# Patient Record
Sex: FEMALE | Race: OTHER
[De-identification: names, ages, dates, MRNs, and addresses within clinical notes are randomized per-mention and may not be internally consistent; named-entity substitution may affect disease eponyms.]

---

## 2023-04-18 ENCOUNTER — NURSE TRIAGE (OUTPATIENT)
Dept: OTHER | Facility: CLINIC | Age: 28
End: 2023-04-18

## 2023-04-18 NOTE — TELEPHONE ENCOUNTER
Location of patient: [place]    Received call from Candy Mcneil at [facility]; Patient with Red Flag Complaint requesting to establish care with [facility]. Subjective: Caller states \"Dizzy\"     Current Symptoms:   Dizziness with standing or \"moving too fast\"  \"I feel off-balanced\"  Nausea  Intermittent HA's  Intermittent bilateral hand swelling  Denies cold sx, CP, SOB, or weakness/numbness on one side of the body    Onset: 1 week ago; intermittent    Pain Severity: 0/10    Temperature: Denies    What has been tried: Drinking fluids    LMP:  Depo  Pregnant: No    Recommended disposition: See PCP within 3 Days  Advised UCC if no available appointments. Care advice provided, patient verbalizes understanding; denies any other questions or concerns; instructed to call back for any new or worsening symptoms. Patient/Caller agrees with recommended disposition; writer provided warm transfer to Bernice Brooks at [facility] for appointment scheduling    Attention Provider: Thank you for allowing me to participate in the care of your patient. The patient was connected to triage in response to information provided to the [facility]. Please do not respond through this encounter as the response is not directed to a shared pool.     Reason for Disposition   MILD dizziness (e.g., walking normally) and has NOT been evaluated by physician for this (Exception: dizziness caused by heat exposure, sudden standing, or poor fluid intake)    Protocols used: Dizziness-ADULT-[place]

## 2023-05-11 ENCOUNTER — OFFICE VISIT (OUTPATIENT)
Dept: FAMILY MEDICINE CLINIC | Facility: CLINIC | Age: 28
End: 2023-05-11
Payer: COMMERCIAL

## 2023-05-11 VITALS
RESPIRATION RATE: 17 BRPM | SYSTOLIC BLOOD PRESSURE: 138 MMHG | BODY MASS INDEX: 44.41 KG/M2 | TEMPERATURE: 98 F | OXYGEN SATURATION: 97 % | DIASTOLIC BLOOD PRESSURE: 84 MMHG | HEART RATE: 74 BPM | HEIGHT: 68 IN | WEIGHT: 293 LBS

## 2023-05-11 DIAGNOSIS — Z13.220 SCREENING FOR LIPID DISORDERS: ICD-10-CM

## 2023-05-11 DIAGNOSIS — F41.9 ANXIETY AND DEPRESSION: ICD-10-CM

## 2023-05-11 DIAGNOSIS — F32.A ANXIETY AND DEPRESSION: ICD-10-CM

## 2023-05-11 DIAGNOSIS — R42 DIZZINESS: ICD-10-CM

## 2023-05-11 DIAGNOSIS — R53.83 FATIGUE, UNSPECIFIED TYPE: ICD-10-CM

## 2023-05-11 DIAGNOSIS — R63.5 WEIGHT GAIN: Primary | ICD-10-CM

## 2023-05-11 DIAGNOSIS — K21.9 GASTROESOPHAGEAL REFLUX DISEASE WITHOUT ESOPHAGITIS: ICD-10-CM

## 2023-05-11 PROCEDURE — 99205 OFFICE O/P NEW HI 60 MIN: CPT

## 2023-05-11 RX ORDER — ESCITALOPRAM OXALATE 5 MG/1
5 TABLET ORAL DAILY
Qty: 30 TABLET | Refills: 1 | Status: SHIPPED | OUTPATIENT
Start: 2023-05-11

## 2023-05-11 RX ORDER — OMEPRAZOLE 40 MG/1
40 CAPSULE, DELAYED RELEASE ORAL
Qty: 90 CAPSULE | Refills: 1 | Status: SHIPPED | OUTPATIENT
Start: 2023-05-11

## 2023-05-11 SDOH — ECONOMIC STABILITY: HOUSING INSECURITY
IN THE LAST 12 MONTHS, WAS THERE A TIME WHEN YOU DID NOT HAVE A STEADY PLACE TO SLEEP OR SLEPT IN A SHELTER (INCLUDING NOW)?: NO

## 2023-05-11 SDOH — ECONOMIC STABILITY: FOOD INSECURITY: WITHIN THE PAST 12 MONTHS, YOU WORRIED THAT YOUR FOOD WOULD RUN OUT BEFORE YOU GOT MONEY TO BUY MORE.: SOMETIMES TRUE

## 2023-05-11 SDOH — ECONOMIC STABILITY: INCOME INSECURITY: HOW HARD IS IT FOR YOU TO PAY FOR THE VERY BASICS LIKE FOOD, HOUSING, MEDICAL CARE, AND HEATING?: SOMEWHAT HARD

## 2023-05-11 SDOH — ECONOMIC STABILITY: FOOD INSECURITY: WITHIN THE PAST 12 MONTHS, THE FOOD YOU BOUGHT JUST DIDN'T LAST AND YOU DIDN'T HAVE MONEY TO GET MORE.: NEVER TRUE

## 2023-05-11 ASSESSMENT — ENCOUNTER SYMPTOMS
VOMITING: 1
ABDOMINAL DISTENTION: 1
NAUSEA: 1
EYES NEGATIVE: 1
ALLERGIC/IMMUNOLOGIC NEGATIVE: 1
ABDOMINAL PAIN: 1
DIARRHEA: 1

## 2023-05-11 ASSESSMENT — PATIENT HEALTH QUESTIONNAIRE - PHQ9
7. TROUBLE CONCENTRATING ON THINGS, SUCH AS READING THE NEWSPAPER OR WATCHING TELEVISION: 3
6. FEELING BAD ABOUT YOURSELF - OR THAT YOU ARE A FAILURE OR HAVE LET YOURSELF OR YOUR FAMILY DOWN: 3
10. IF YOU CHECKED OFF ANY PROBLEMS, HOW DIFFICULT HAVE THESE PROBLEMS MADE IT FOR YOU TO DO YOUR WORK, TAKE CARE OF THINGS AT HOME, OR GET ALONG WITH OTHER PEOPLE: 2
SUM OF ALL RESPONSES TO PHQ QUESTIONS 1-9: 21
SUM OF ALL RESPONSES TO PHQ QUESTIONS 1-9: 21
SUM OF ALL RESPONSES TO PHQ9 QUESTIONS 1 & 2: 4
1. LITTLE INTEREST OR PLEASURE IN DOING THINGS: 2
4. FEELING TIRED OR HAVING LITTLE ENERGY: 3
2. FEELING DOWN, DEPRESSED OR HOPELESS: 2
SUM OF ALL RESPONSES TO PHQ QUESTIONS 1-9: 21
SUM OF ALL RESPONSES TO PHQ QUESTIONS 1-9: 21
9. THOUGHTS THAT YOU WOULD BE BETTER OFF DEAD, OR OF HURTING YOURSELF: 0
3. TROUBLE FALLING OR STAYING ASLEEP: 3
8. MOVING OR SPEAKING SO SLOWLY THAT OTHER PEOPLE COULD HAVE NOTICED. OR THE OPPOSITE, BEING SO FIGETY OR RESTLESS THAT YOU HAVE BEEN MOVING AROUND A LOT MORE THAN USUAL: 3
5. POOR APPETITE OR OVEREATING: 2

## 2023-05-11 ASSESSMENT — COLUMBIA-SUICIDE SEVERITY RATING SCALE - C-SSRS
2. HAVE YOU ACTUALLY HAD ANY THOUGHTS OF KILLING YOURSELF?: NO
6. HAVE YOU EVER DONE ANYTHING, STARTED TO DO ANYTHING, OR PREPARED TO DO ANYTHING TO END YOUR LIFE?: NO
1. WITHIN THE PAST MONTH, HAVE YOU WISHED YOU WERE DEAD OR WISHED YOU COULD GO TO SLEEP AND NOT WAKE UP?: YES

## 2023-05-11 NOTE — PROGRESS NOTES
Tay Reeder .... Subjective:     Malathi Burt 1995 is a 28 y.o. female New patient, here for evaluation of the following:   Chief Complaint   Patient presents with    New Patient     Patient isn't fasting today    Depression       Ajit Carranza is here to establish care with me and discuss other issues. She states that she has been having nausea and has had fainting spells and has had some dizziness for about 1 month ago. She took a pregnancy test and it was negative. It has gotten a little bit better but there are some days she does wake up nauseous and she states that her appetite has declined. She feel In the upper gastric region and it is worse when she eats. She states that feels like she feels like milk products aggravate the pain. She also has been having an increased in bowel movements. She has gained over 40 lbs in the past year. She feels unmotivated and depressed. She has battled with depression in the past but stopped lexapro within 2 weeks of starting the anti-depressant. She would really like to try and restart and give it a fair chance. DepressionPatient presents with the following symptoms: nervousness/anxiety.         Past Medical History:   Diagnosis Date    Chronic lower back pain     Depression     Migraines      Past Surgical History:   Procedure Laterality Date    OTHER SURGICAL HISTORY      cyst removal from left ovary when she was 21 yrs old    TONSILLECTOMY        Family History   Problem Relation Age of Onset    High Cholesterol Mother     Heart Murmur Mother     Thyroid Disease Father     Diabetes Father      Social History     Tobacco Use    Smoking status: Never    Smokeless tobacco: Never   Substance Use Topics    Alcohol use: Yes      Current Outpatient Medications   Medication Sig Dispense Refill    DICLOFENAC PO Take 50 mg by mouth daily as needed      escitalopram (LEXAPRO) 5 MG tablet Take 1 tablet by mouth daily 30 tablet 1    diclofenac sodium (VOLTAREN) 1 % GEL Apply 2 g topically 4

## 2023-05-12 DIAGNOSIS — Z32.01 POSITIVE URINE PREGNANCY TEST: Primary | ICD-10-CM

## 2023-05-12 NOTE — PROGRESS NOTES
Patient took a urine pregnancy yesterday and it came put positive, she had taken one like 2-3 weeks ago and it was negative and she is on the depo shot. I told her not to start the lexapro nor the diclofenac until she confirms that she is indeed pregnant and goes to see OB. She wants to have a serum pregnancy test and I have ordered it and she will have it completed at one of our draw sites.